# Patient Record
Sex: FEMALE | Race: WHITE
[De-identification: names, ages, dates, MRNs, and addresses within clinical notes are randomized per-mention and may not be internally consistent; named-entity substitution may affect disease eponyms.]

---

## 2020-02-11 ENCOUNTER — HOSPITAL ENCOUNTER (EMERGENCY)
Dept: HOSPITAL 33 - ED | Age: 50
Discharge: HOME | End: 2020-02-11
Payer: SELF-PAY

## 2020-02-11 VITALS — DIASTOLIC BLOOD PRESSURE: 91 MMHG | SYSTOLIC BLOOD PRESSURE: 142 MMHG | HEART RATE: 96 BPM

## 2020-02-11 VITALS — OXYGEN SATURATION: 95 %

## 2020-02-11 DIAGNOSIS — G43.909: Primary | ICD-10-CM

## 2020-02-11 PROCEDURE — 36000 PLACE NEEDLE IN VEIN: CPT

## 2020-02-11 PROCEDURE — 96375 TX/PRO/DX INJ NEW DRUG ADDON: CPT

## 2020-02-11 PROCEDURE — 96374 THER/PROPH/DIAG INJ IV PUSH: CPT

## 2020-02-11 PROCEDURE — 99284 EMERGENCY DEPT VISIT MOD MDM: CPT

## 2020-02-11 NOTE — ERPHSYRPT
- History of Present Illness


Time Seen by Provider: 02/11/20 18:26


Source: patient


Exam Limitations: no limitations


Physician History: 





Patient is a 49-year-old female with a past medical history significant for 

migraines for which she follows with neurology as an outpatient with Dr. Saeed presents with a chief complaint of a headache.


Onset reportedly was around 3:00 this morning.


She states that she awoke with a frontal headache that had progressed to got 

worse throughout the day.


She endorsed having photophobia in addition to flashing lights in both of her 

visual fields whenever she closes her eyes.


She also endorsed having some nausea in addition to vomiting.


The above symptoms are consistent with her prior migraine symptoms.


She denies any syncope, near syncope, focal weakness, numbness, and neck pain 

and reportedly was taking her medication as prescribed as well as Tylenol with 

no relief in her symptoms.


Because she did not experience any relief, she decided to come to the emergency 

department for further evaluation and management.


Her headache was described as a diffuse sharp pain located over her entire head 

at this time.


The pain seems to radiate from the front to the back and is constant and 

moderate to severe in severity.


Poorly took the last dose of Tylenol at around 1500 afternoon.


He denies taking anticoagulants or antiplatelets, specifically Plavix.





Timing/Duration: today


Associated Symptoms: nausea, vomiting, headaches, No shortness of breath, No 

chest pain, No fever, No loss of appetite, No syncope


Allergies/Adverse Reactions: 








bee pollen Allergy (Severe, Verified 02/11/20 18:39)


 Swelling


guaifenesin [From Mucinex] Allergy (Severe, Verified 02/11/20 18:39)


 Swelling


honey Allergy (Severe, Verified 02/11/20 18:39)


 


morphine Allergy (Severe, Verified 02/11/20 18:39)


 cardiac arrest


Penicillins Allergy (Severe, Verified 02/11/20 18:39)


 Swelling





Home Medications: 








Alprazolam 0.5 mg*** [xanAX 0.5 MG***] 0.5 mg PO 02/11/20 [History]


Propranolol HCl [Inderal LA] 160 mg PO DAILY 02/11/20 [History]


Tizanidine HCl 4 mg** [Zanaflex 4 MG**] 4 mg PO DAILY 02/11/20 [History]


Zolpidem Tartrate 10 mg PO DAILY 02/11/20 [History]








- Review of Systems


Constitutional: No Fever, No Chills, No Fatigue


Eyes: Photophobia, Other (Flashing lights in visual fields when closing eyes)


Ears, Nose, & Throat: No Symptoms, Other (Phonophobia)


Respiratory: No Cough, No Cyanosis, No Dyspnea


Cardiac: No Symptoms


Abdominal/Gastrointestinal: Nausea, Vomiting


Musculoskeletal: No Symptoms


Skin: No Symptoms


Neurological: Headache, No Gait Changes, No Paralysis, No Parasthesia, No 

Sensory Changes, No Speech Changes


Psychological: No Symptoms


All Other Systems: Reviewed and Negative





- Nursing Vital Signs


Nursing Vital Signs: 


 Initial Vital Signs











Temperature  97.6 F   02/11/20 18:25


 


Pulse Rate  111 H  02/11/20 18:25


 


Respiratory Rate  20   02/11/20 18:25


 


Blood Pressure  153/115   02/11/20 18:25


 


O2 Sat by Pulse Oximetry  100   02/11/20 18:25








 Pain Scale











Pain Intensity                 2

















- Physical Exam


General Appearance: mild distress, alert, other (The patient appeared to be 

uncomfortable and sitting in a dark room when I entered her room)


Eye Exam: PERRL/EOMI, eyes nml inspection, photophobia, No scleral icterus, No 

pale conjunctivae, No EOM palsy/anisocoria


Ears, Nose, Throat Exam: normal ENT inspection, TMs normal, pharynx normal, 

moist mucous membranes, No TM abnormal (R), No TM abnormal (L), No pharyngeal 

erythema, No tonsillar exudate


Neck Exam: normal inspection, non-tender, supple, No meningismus, No JVD


Respiratory Exam: normal breath sounds, lungs clear, airway intact, No chest 

tenderness, No respiratory distress


Cardiovascular Exam: regular rate/rhythm, normal heart sounds, normal 

peripheral pulses, tachycardia, capillary refill <2 sec, No murmur, No friction 

rub, No gallop


Gastrointestinal/Abdomen Exam: soft, No tenderness, No distention, No mass


Pelvic Exam: not done


Rectal Exam: deferred


Extremity Exam: normal inspection, other ( strength 4+ bilaterally, bicep 

flexion bilaterally, hHip flexion 4+ bilaterally, dorsi flexion and plantar 

flexion 4+ bilaterally)


Neurologic Exam: alert, oriented x 3, cooperative, CNs II-XII nml as tested, 

normal mood/affect, nml cerebellar function, sensation nml, other (No pronator 

drift, no ankle clonus, station to gross touch intact in the upper and lower 

extremities and equal bilaterally, biceps reflex was 2+ bilaterallyPatellar 

reflexes 2+ bilaterallyThere is no pronator drift.), No motor deficits, No 

sensory deficit, No disoriented, No confusion, No intoxicated appearance, No 

motor weakness, No facial droop, No slurred speech, No aphasia, No abnormal gait

, No abnormal cerebellar tests, No EOM palsy





- Course


Nursing assessment & vital signs reviewed: Yes


Ordered Tests: 


 Active Orders 24 hr











 Category Date Time Status


 


 IV Insertion STAT Care  02/11/20 18:39 Active








Medication Summary














Discontinued Medications














Generic Name Dose Route Start Last Admin





  Trade Name Freq  PRN Reason Stop Dose Admin


 


Diphenhydramine HCl  25 mg  02/11/20 18:42  02/11/20 18:51





  Benadryl 50 Mg/Ml***  IV  02/11/20 18:43  25 mg





  STAT ONE   Administration





     





     





     





     


 


Diphenhydramine HCl  Confirm  02/11/20 18:45  





  Benadryl 50 Mg/Ml***  Administered  02/11/20 18:46  





  Dose   





  50 mg   





  .ROUTE   





  .STK-MED ONE   





     





     





     





     


 


Sodium Chloride  1,000 mls @ 999 mls/hr  02/11/20 18:39  02/11/20 18:48





  Sodium Chloride 0.9% 1000 Ml  IV  02/11/20 19:39  999 mls/hr





  .Q1H1M STA   Administration





     





     





     





     


 


Sodium Chloride  Confirm  02/11/20 18:46  





  Sodium Chloride 0.9% 1000 Ml  Administered  02/11/20 18:47  





  Dose   





  1,000 mls @ ud   





  .ROUTE   





  .STK-MED ONE   





     





     





     





     


 


Ketorolac Tromethamine  30 mg  02/11/20 18:39  02/11/20 18:49





  Toradol 30 Mg Injection***  IV  02/11/20 18:40  30 mg





  STAT ONE   Administration





     





     





     





     


 


Ketorolac Tromethamine  Confirm  02/11/20 18:45  





  Toradol 30 Mg Injection***  Administered  02/11/20 18:46  





  Dose   





  30 mg   





  .ROUTE   





  .STK-MED ONE   





     





     





     





     


 


Metoclopramide HCl  10 mg  02/11/20 18:39  02/11/20 18:49





  Reglan 10 Mg/2 Ml***  IV  02/11/20 18:40  10 mg





  STAT ONE   Administration





     





     





     





     


 


Metoclopramide HCl  Confirm  02/11/20 18:45  





  Reglan 10 Mg/2 Ml***  Administered  02/11/20 18:46  





  Dose   





  10 mg   





  .ROUTE   





  .STK-MED ONE   





     





     





     





     


 


Ondansetron HCl  4 mg  02/11/20 18:39  02/11/20 18:50





  Zofran 4 Mg/2 Ml Vial**  IV  02/11/20 18:40  4 mg





  STAT ONE   Administration





     





     





     





     


 


Ondansetron HCl  Confirm  02/11/20 18:45  





  Zofran 4 Mg/2 Ml Vial**  Administered  02/11/20 18:46  





  Dose   





  4 mg   





  .ROUTE   





  .STK-MED ONE   





     





     





     





     














- Progress


Progress: improved


Progress Note: 





02/11/20 19:47


The patient reportedly was feeling better and pain was a 1/10.  The patient 

stated she was ready to go home.  


02/11/20 20:29


Toxic in appearance.  The patient has no neuro deficits at this time.  She 

currently has no red flag features for headache at this time and her symptoms 

were described as her typical migraine symptoms.  Due to this and her 

reassuring exam, I believe the patient is likely suffering from her typical 

migraine and I will defer neurocranial imaging at this time, specifically head 

CT and MRI given my low suspicion for intracranial hemorrhage, mass, infectious 

etiology, or CVT.





She was treated symptomatically for a migraine and upon reassessment endorsed 

that she was feeling better and ready to be discharged home.  She was 

discharged home and instructed to take her prescribed medicines as instructed 

and to call and follow-up with her neurologist if able by the end of this week.

  ED return precautions for headache was given.  The patient agreed with and 

verbally understood the discharge plan.


Counseled pt/family regarding: diagnosis, need for follow-up





- Departure


Departure Disposition: Home


Clinical Impression: 


 Migraine





Condition: Stable


Critical Care Time: No


Referrals: 


BILL VALERIO [Primary Care Provider] - 


BERE SAEED [CONSULTING PHYSICIAN] - 


Instructions:  Migraine Headaches in Adults


Additional Instructions: 


Please follow-up with your neurologist if able in the next 1-2 days.